# Patient Record
Sex: MALE | URBAN - METROPOLITAN AREA
[De-identification: names, ages, dates, MRNs, and addresses within clinical notes are randomized per-mention and may not be internally consistent; named-entity substitution may affect disease eponyms.]

---

## 2019-02-06 PROCEDURE — EVISIT: Performed by: PHYSICIAN ASSISTANT

## 2019-02-07 ENCOUNTER — AMB VIDEO VISIT (OUTPATIENT)
Dept: OTHER | Facility: HOSPITAL | Age: 59
End: 2019-02-07

## 2019-02-07 ENCOUNTER — AMB VIDEO VISIT (OUTPATIENT)
Dept: URGENT CARE | Facility: CLINIC | Age: 59
End: 2019-02-07

## 2019-02-07 DIAGNOSIS — H10.33 ACUTE CONJUNCTIVITIS OF BOTH EYES, UNSPECIFIED ACUTE CONJUNCTIVITIS TYPE: Primary | ICD-10-CM

## 2019-02-07 RX ORDER — OFLOXACIN 3 MG/ML
1 SOLUTION/ DROPS OPHTHALMIC 4 TIMES DAILY
Qty: 5 ML | Refills: 0 | Status: SHIPPED | OUTPATIENT
Start: 2019-02-07

## 2019-02-07 NOTE — PATIENT INSTRUCTIONS
Conjunctivitis:   -There is redness and erythema of the conjunctiva bilaterally  Will prescribe ofloxacin eye drops to be taken as directed  Use warm compress on the area  You can use saline eye drops  You can also do nightly eyelid scrubs  If your eye worsens you should follow up with an ophthalmologist immediately

## 2019-02-07 NOTE — PROGRESS NOTES
Video Visit - Lamar Wright 60 y o  male MRN: [id number]    REQUIRED DOCUMENTATION:       There were no vitals filed for this visit  1  [facility]  2  Provider located at [address] [facility]  [address] [facility]   [phone]   3  [facility] provider: Angelique Gutierrez PA-C   4  Identify all parties in room with patient during [facility] visit: None     5  After connecting through [employer], patient was identified by name and date of birth  Patient was then informed that this was a Telemedicine visit and that the exam was being conducted confidentially over secure lines  My office door was closed  No one else was in the room  Patient acknowledged consent and understanding of privacy and security of the Telemedicine visit  I informed the patient that I have reviewed their record in Epic and presented the opportunity for them to ask any questions regarding the visit today  The patient agreed to participate  [facility]        NAME: Lamar Wright is a 60 y o  male  : 1960    MRN: [id number]  DATE: 2019  TIME: 11:26 AM    Assessment and Plan   Acute conjunctivitis of both eyes, unspecified acute conjunctivitis type [H10 33]  1  Acute conjunctivitis of both eyes, unspecified acute conjunctivitis type  ofloxacin (OCUFLOX) 0 3 % ophthalmic solution         Patient Instructions   Conjunctivitis:   -There is redness and erythema of the conjunctiva bilaterally  Will prescribe ofloxacin eye drops to be taken as directed  Use warm compress on the area  You can use saline eye drops  You can also do nightly eyelid scrubs  If your eye worsens you should follow up with an ophthalmologist immediately  Follow up with PCP in 3-5 days  Proceed to  ER if symptoms worsen  Chief Complaint   No chief complaint on file          History of Present Illness       The patient presents today for an Evisit for a one day hx of right eye conjunctival erythema  He states that yesterday when he woke up he noticed his right eye was red, he reports that the redness has worsened and that the left conjunctiva is now red  He states that he has been using visine with no relief  There is no visual changes, fever, chills, change in EOM, trauma, discharge, headache, dizziness, sick contacts, recent travel  He is not a contact lens wearer  Review of Systems   Review of Systems   Constitutional: Negative for activity change, appetite change, chills, diaphoresis, fatigue and fever  HENT: Negative for congestion, ear discharge, ear pain, facial swelling, postnasal drip, rhinorrhea, sinus pain, sinus pressure and sneezing  Eyes: Positive for redness  Negative for photophobia, pain, discharge, itching and visual disturbance  Respiratory: Negative for cough, chest tightness, shortness of breath and wheezing  Cardiovascular: Negative for chest pain and palpitations  Musculoskeletal: Negative for arthralgias and myalgias  Skin: Negative for rash  Allergic/Immunologic: Negative for immunocompromised state  Neurological: Negative for dizziness, weakness, light-headedness, numbness and headaches  Hematological: Negative for adenopathy  Current Medications       Current Outpatient Prescriptions:     ofloxacin (OCUFLOX) 0 3 % ophthalmic solution, Administer 1 drop to both eyes 4 (four) times a day, Disp: 5 mL, Rfl: 0    Current Allergies     Allergies as of 02/07/2019 - never reviewed   Allergen Reaction Noted    Penicillins  02/07/2019            The following portions of the patient's history were reviewed and updated as appropriate: allergies, current medications, past family history, past medical history, past social history, past surgical history and problem list      No past medical history on file  No past surgical history on file  No family history on file  Medications have been verified          Objective   There were no vitals taken for this visit  Physical Exam     Physical Exam   Constitutional: He is oriented to person, place, and time  He appears well-developed and well-nourished  No distress  Eyes: Right eye exhibits no discharge and no exudate  Left eye exhibits no discharge and no exudate  Right conjunctiva is injected  Right conjunctiva has no hemorrhage  Left conjunctiva is injected  Right eye exhibits normal extraocular motion and no nystagmus  Left eye exhibits normal extraocular motion and no nystagmus  Neurological: He is alert and oriented to person, place, and time  Skin: He is not diaphoretic  Psychiatric: He has a normal mood and affect   His behavior is normal